# Patient Record
Sex: FEMALE | Race: WHITE
[De-identification: names, ages, dates, MRNs, and addresses within clinical notes are randomized per-mention and may not be internally consistent; named-entity substitution may affect disease eponyms.]

---

## 2022-04-23 ENCOUNTER — HOSPITAL ENCOUNTER (EMERGENCY)
Dept: HOSPITAL 26 - MED | Age: 22
Discharge: HOME | End: 2022-04-23
Payer: MEDICAID

## 2022-04-23 VITALS — DIASTOLIC BLOOD PRESSURE: 78 MMHG | SYSTOLIC BLOOD PRESSURE: 120 MMHG

## 2022-04-23 VITALS — WEIGHT: 135.37 LBS | HEIGHT: 62 IN | BODY MASS INDEX: 24.91 KG/M2

## 2022-04-23 DIAGNOSIS — K59.00: Primary | ICD-10-CM

## 2022-04-23 DIAGNOSIS — J45.909: ICD-10-CM

## 2022-04-23 DIAGNOSIS — Z90.49: ICD-10-CM

## 2022-04-23 DIAGNOSIS — Z79.899: ICD-10-CM

## 2022-04-23 DIAGNOSIS — R53.1: ICD-10-CM

## 2022-04-23 LAB
ALBUMIN FLD-MCNC: 3.6 G/DL (ref 3.4–5)
ANION GAP SERPL CALCULATED.3IONS-SCNC: 9.6 MMOL/L (ref 8–16)
AST SERPL-CCNC: 19 U/L (ref 15–37)
BASOPHILS # BLD AUTO: 0.1 K/UL (ref 0–0.22)
BASOPHILS NFR BLD AUTO: 0.9 % (ref 0–2)
BILIRUB SERPL-MCNC: 0.1 MG/DL (ref 0–1)
BUN SERPL-MCNC: 9 MG/DL (ref 7–18)
CHLORIDE SERPL-SCNC: 103 MMOL/L (ref 98–107)
CO2 SERPL-SCNC: 30.2 MMOL/L (ref 21–32)
CREAT SERPL-MCNC: 0.7 MG/DL (ref 0.6–1.3)
EOSINOPHIL # BLD AUTO: 0.2 K/UL (ref 0–0.4)
EOSINOPHIL NFR BLD AUTO: 2.5 % (ref 0–4)
ERYTHROCYTE [DISTWIDTH] IN BLOOD BY AUTOMATED COUNT: 13.1 % (ref 11.6–13.7)
GFR SERPL CREATININE-BSD FRML MDRD: 136 ML/MIN (ref 90–?)
GLUCOSE SERPL-MCNC: 83 MG/DL (ref 74–106)
HCT VFR BLD AUTO: 36.2 % (ref 36–48)
HGB BLD-MCNC: 12.1 G/DL (ref 12–16)
LIPASE SERPL-CCNC: 57 U/L (ref 73–393)
LYMPHOCYTES # BLD AUTO: 2.7 K/UL (ref 2.5–16.5)
LYMPHOCYTES NFR BLD AUTO: 40.4 % (ref 20.5–51.1)
MAGNESIUM SERPL-MCNC: 2.2 MG/DL (ref 1.8–2.4)
MCH RBC QN AUTO: 29 PG (ref 27–31)
MCHC RBC AUTO-ENTMCNC: 34 G/DL (ref 33–37)
MCV RBC AUTO: 87.3 FL (ref 80–94)
MONOCYTES # BLD AUTO: 0.4 K/UL (ref 0.8–1)
MONOCYTES NFR BLD AUTO: 5.8 % (ref 1.7–9.3)
NEUTROPHILS # BLD AUTO: 3.4 K/UL (ref 1.8–7.7)
NEUTROPHILS NFR BLD AUTO: 50.4 % (ref 42.2–75.2)
PHOSPHATE SERPL-MCNC: 4.1 MG/DL (ref 2.5–4.9)
PLATELET # BLD AUTO: 247 K/UL (ref 140–450)
POTASSIUM SERPL-SCNC: 3.8 MMOL/L (ref 3.5–5.1)
RBC # BLD AUTO: 4.15 MIL/UL (ref 4.2–5.4)
SODIUM SERPL-SCNC: 139 MMOL/L (ref 136–145)
WBC # BLD AUTO: 6.8 K/UL (ref 4.8–10.8)

## 2022-04-23 RX ADMIN — Medication ONE ML: at 21:45

## 2022-07-03 ENCOUNTER — HOSPITAL ENCOUNTER (EMERGENCY)
Dept: HOSPITAL 26 - MED | Age: 22
Discharge: HOME | End: 2022-07-03
Payer: MEDICAID

## 2022-07-03 VITALS — DIASTOLIC BLOOD PRESSURE: 79 MMHG | SYSTOLIC BLOOD PRESSURE: 115 MMHG

## 2022-07-03 VITALS — WEIGHT: 131.13 LBS | BODY MASS INDEX: 24.13 KG/M2 | HEIGHT: 62 IN

## 2022-07-03 DIAGNOSIS — J02.0: Primary | ICD-10-CM

## 2022-07-03 DIAGNOSIS — J45.909: ICD-10-CM

## 2022-07-03 DIAGNOSIS — Z79.2: ICD-10-CM

## 2022-07-03 DIAGNOSIS — Z79.899: ICD-10-CM

## 2022-07-03 NOTE — NUR
Patient discharged with v/s stable. Written and verbal after care instructions 
ABOUT STREP THROAT given and explained. 

Patient alert, oriented and verbalized understanding of instructions. 
Ambulatory with steady gait. All questions addressed prior to discharge. ID 
band removed. Patient advised to follow up with PMD. Rx of 
AMOXICILLIN/POTASSIUM CLAV given. Patient educated on indication of medication 
including possible reaction and side effects. Opportunity to ask questions 
provided and answered.

## 2022-07-03 NOTE — NUR
21/F PRESENTS TO ED WITH C/O COUGH, WEAKNESS AND SUBJECTIVE FEVER X2 DAYS. 
PATIENT DENIES SOB, CP OR RECENT SICK CONTACTS, REPORTS TAKING MOTRIN AT 2AM 
TODAY.